# Patient Record
Sex: MALE | Race: WHITE | Employment: FULL TIME | URBAN - METROPOLITAN AREA
[De-identification: names, ages, dates, MRNs, and addresses within clinical notes are randomized per-mention and may not be internally consistent; named-entity substitution may affect disease eponyms.]

---

## 2024-10-27 ENCOUNTER — HOSPITAL ENCOUNTER (EMERGENCY)
Facility: CLINIC | Age: 39
Discharge: HOME | End: 2024-10-27
Attending: EMERGENCY MEDICINE
Payer: COMMERCIAL

## 2024-10-27 VITALS
BODY MASS INDEX: 33.65 KG/M2 | RESPIRATION RATE: 18 BRPM | HEIGHT: 77 IN | TEMPERATURE: 98.9 F | HEART RATE: 77 BPM | OXYGEN SATURATION: 97 % | WEIGHT: 285 LBS

## 2024-10-27 DIAGNOSIS — H66.90 ACUTE OTITIS MEDIA, UNSPECIFIED OTITIS MEDIA TYPE: Primary | ICD-10-CM

## 2024-10-27 PROCEDURE — 99283 EMERGENCY DEPT VISIT LOW MDM: CPT

## 2024-10-27 RX ORDER — AMOXICILLIN 500 MG/1
500 CAPSULE ORAL 2 TIMES DAILY
Qty: 20 CAPSULE | Refills: 0 | Status: SHIPPED | OUTPATIENT
Start: 2024-10-27 | End: 2024-11-06

## 2024-10-27 ASSESSMENT — PAIN - FUNCTIONAL ASSESSMENT: PAIN_FUNCTIONAL_ASSESSMENT: WONG-BAKER FACES

## 2024-10-27 ASSESSMENT — PAIN SCALES - WONG BAKER: WONGBAKER_NUMERICALRESPONSE: HURTS A LITTLE BIT

## 2024-10-27 NOTE — DISCHARGE INSTRUCTIONS
Tylenol Motrin as needed for pain or fever.  Take the antibiotic as instructed.  Follow-up with your primary care doctor in the morning for reevaluation.  Return to the emergency department with any probs or concerns as discussed.

## 2024-10-27 NOTE — ED PROVIDER NOTES
[facility]  [address] [facility] [address]  Phone: [phone]  EMERGENCY DEPARTMENT ENCOUNTER      Pt Name: Isacc Rosas  MRN: [id number]  Birthdate 1985  Date of evaluation: 10/27/2024    CHIEF COMPLAINT       Chief Complaint   Patient presents with    Pharyngitis    Ear Pain     Left ear painband sore throat       HISTORY OF PRESENT ILLNESS    Isacc Rosas is a 39 y.o. male who presents left ear pain patient states has had ear pain for the last 3 days.  Last night it woke him up from his sleep so he came in to be evaluated.  There are 3 other people in the household with otitis media.  He has had a sore throat.  Denies any fever.  Denies any cough, chest pain, shortness of breath.  He denies any hearing loss or tinnitus.  Has not taking anything at home for pain.    REVIEW OF SYSTEMS     Review of Systems   All other systems reviewed and are negative.    PAST MEDICAL HISTORY    has no past medical history on file.    SURGICAL HISTORY      has no past surgical history on file.    CURRENT MEDICATIONS       Previous Medications    No medications on file       ALLERGIES     has No Known Allergies.    FAMILY HISTORY     has no family status information on file.      family history is not on file.    SOCIAL HISTORY          PHYSICAL EXAM       ED Triage Vitals [10/27/24 0923]   BP Systolic BP Percentile Diastolic BP Percentile Temp Temp Source Pulse Respirations SpO2   -- -- -- 98.9 °F (37.2 °C) Oral 77 18 97 %      Height Weight - Scale         1.956 m (6' 5\") 129.3 kg (285 lb)           Pulse 77   Temp 98.9 °F (37.2 °C) (Oral)   Resp 18   Ht 1.956 m (6' 5\")   Wt 129.3 kg (285 lb)   SpO2 97%   BMI 33.80 kg/m²   Constitutional: Alert, oriented x3, nontoxic, answering questions appropriately, acting properly for age, in no acute distress   HEENT: Extraocular muscles intact, mucus membranes moist, right TM normal, left TM slightly injected with dull fluid noted.  Mild posterior pharyngeal erythema,

## 2025-07-21 ENCOUNTER — HOSPITAL ENCOUNTER (EMERGENCY)
Facility: CLINIC | Age: 40
Discharge: HOME | End: 2025-07-21
Attending: EMERGENCY MEDICINE
Payer: COMMERCIAL

## 2025-07-21 VITALS
RESPIRATION RATE: 18 BRPM | SYSTOLIC BLOOD PRESSURE: 142 MMHG | BODY MASS INDEX: 32.47 KG/M2 | DIASTOLIC BLOOD PRESSURE: 84 MMHG | TEMPERATURE: 98 F | OXYGEN SATURATION: 98 % | WEIGHT: 275 LBS | HEIGHT: 77 IN | HEART RATE: 79 BPM

## 2025-07-21 DIAGNOSIS — H92.02 EARACHE ON LEFT: Primary | ICD-10-CM

## 2025-07-21 PROCEDURE — 99282 EMERGENCY DEPT VISIT SF MDM: CPT

## 2025-07-21 ASSESSMENT — PAIN - FUNCTIONAL ASSESSMENT: PAIN_FUNCTIONAL_ASSESSMENT: NONE - DENIES PAIN

## 2025-07-21 NOTE — ED PROVIDER NOTES
complaints.     FINAL IMPRESSION      1. Earache on left          DISPOSITION/PLAN   DISPOSITION Decision To Discharge 07/21/2025 08:24:49 AM   DISPOSITION CONDITION Stable         CONDITION ON DISPOSITION: See chart     PATIENT REFERRED TO:  Your family physician    Schedule an appointment as soon as possible for a visit   As needed      DISCHARGE MEDICATIONS:  New Prescriptions    No medications on file       (Please note that portions of this note were completed with a voice recognition program.  Efforts were made to edit the dictations but occasionally words are mis-transcribed.  Additionally, portions of this note may also include information that was incorporated after care transfer to another provider that were not available at the time of my evaluation.  Some of this information could likely include laboratory values, vital sign updates, medications etc.)     Krzysztof Blood DO   Attending Emergency Physician     Krzysztof Blood DO  07/21/25 3230

## 2025-07-21 NOTE — DISCHARGE INSTRUCTIONS
Return immediately if any worsening symptoms or any other concerns    Please understand that early in the process of an illness or injury, an emergency department workup can be falsely reassuring.      Tell us how we did visit: [email or web address]   and let us know about your experience